# Patient Record
Sex: FEMALE | Race: WHITE | NOT HISPANIC OR LATINO
[De-identification: names, ages, dates, MRNs, and addresses within clinical notes are randomized per-mention and may not be internally consistent; named-entity substitution may affect disease eponyms.]

---

## 2022-08-29 PROBLEM — Z00.00 ENCOUNTER FOR PREVENTIVE HEALTH EXAMINATION: Status: ACTIVE | Noted: 2022-08-29

## 2022-09-29 ENCOUNTER — APPOINTMENT (OUTPATIENT)
Dept: OTOLARYNGOLOGY | Facility: CLINIC | Age: 32
End: 2022-09-29

## 2022-09-29 VITALS
BODY MASS INDEX: 28.35 KG/M2 | DIASTOLIC BLOOD PRESSURE: 70 MMHG | SYSTOLIC BLOOD PRESSURE: 107 MMHG | WEIGHT: 160 LBS | HEIGHT: 63 IN | TEMPERATURE: 96.9 F | HEART RATE: 78 BPM

## 2022-09-29 DIAGNOSIS — H61.90 DISORDER OF EXTERNAL EAR, UNSPECIFIED, UNSPECIFIED EAR: ICD-10-CM

## 2022-09-29 PROCEDURE — 99204 OFFICE O/P NEW MOD 45 MIN: CPT

## 2022-09-29 RX ORDER — MUPIROCIN 20 MG/G
2 OINTMENT TOPICAL TWICE DAILY
Qty: 1 | Refills: 3 | Status: ACTIVE | COMMUNITY
Start: 2022-09-29 | End: 1900-01-01

## 2022-09-29 NOTE — PHYSICAL EXAM
[de-identified] : no masses/lesions [FreeTextEntry1] : Ad: small punctum/blackhead within [place] cavum, uncapped and moderate sebum expressed. EAC clear, TM intact, ME clear\par As: small preauricular pit, small punctum within [place] cavum, no drainage. EAC clear, TM intact, ME clear\par  [Midline] : trachea located in midline position [Normal] : no rashes

## 2022-09-29 NOTE — HISTORY OF PRESENT ILLNESS
[de-identified] : 32F here for initial evaluation.\par \par Over the past 2-3 years, she has had a few episodes of ear pain/discomfort. These episodes last a few days and resolve. There is no otorrhea, tinnitus or vertigo; hearing is normal. She wears airpods all the time. Today she feels well.\par \par ROS otherwise unremarkable.

## 2022-09-29 NOTE — ASSESSMENT
[FreeTextEntry1] : 32F here for initial evaluation. Over the past 2-3 years, she has had a few episodes of ear pain/discomfort. These episodes last a few days and resolve. There is no otorrhea, tinnitus or vertigo; hearing is normal. She wears airpods all the time. Today she feels well. On exam, there is a punctum/blackhead within both [place] cavum; the right was occluded w large blackhead, uncapped and moderate sebum expressed; the left was clean. The rest of the head and neck exam is unremarkable.\par She has b/l external ear pits over either [place] which is curious location. This could just be large pimple or something congenital given bilaterality? Either way, there is no infection and exam otherwise unremarkable. Can use mupirocin to right side as needed and keep ears clean. RTO as needed.

## 2024-03-13 ENCOUNTER — NON-APPOINTMENT (OUTPATIENT)
Age: 34
End: 2024-03-13